# Patient Record
Sex: FEMALE | Race: WHITE | NOT HISPANIC OR LATINO | Employment: FULL TIME | URBAN - METROPOLITAN AREA
[De-identification: names, ages, dates, MRNs, and addresses within clinical notes are randomized per-mention and may not be internally consistent; named-entity substitution may affect disease eponyms.]

---

## 2021-11-23 ENCOUNTER — TELEPHONE (OUTPATIENT)
Dept: OBGYN CLINIC | Facility: CLINIC | Age: 41
End: 2021-11-23

## 2021-11-24 ENCOUNTER — TELEPHONE (OUTPATIENT)
Dept: OBGYN CLINIC | Facility: CLINIC | Age: 41
End: 2021-11-24

## 2021-11-24 DIAGNOSIS — Z30.41 SURVEILLANCE FOR BIRTH CONTROL, ORAL CONTRACEPTIVES: ICD-10-CM

## 2021-11-24 DIAGNOSIS — E28.2 PCOS (POLYCYSTIC OVARIAN SYNDROME): Primary | ICD-10-CM

## 2021-11-24 RX ORDER — NORGESTIMATE AND ETHINYL ESTRADIOL 7DAYSX3 28
1 KIT ORAL DAILY
Qty: 90 TABLET | Refills: 0 | Status: SHIPPED | OUTPATIENT
Start: 2021-11-24 | End: 2022-01-28 | Stop reason: SDUPTHER

## 2021-11-24 RX ORDER — NORGESTIMATE AND ETHINYL ESTRADIOL 7DAYSX3 28
1 KIT ORAL DAILY
COMMUNITY
End: 2021-11-24 | Stop reason: SDUPTHER

## 2022-01-27 PROBLEM — E28.2 PCOS (POLYCYSTIC OVARIAN SYNDROME): Status: ACTIVE | Noted: 2022-01-27

## 2022-01-28 ENCOUNTER — ANNUAL EXAM (OUTPATIENT)
Dept: OBGYN CLINIC | Facility: CLINIC | Age: 42
End: 2022-01-28
Payer: COMMERCIAL

## 2022-01-28 VITALS
WEIGHT: 246 LBS | SYSTOLIC BLOOD PRESSURE: 120 MMHG | BODY MASS INDEX: 39.53 KG/M2 | DIASTOLIC BLOOD PRESSURE: 88 MMHG | HEIGHT: 66 IN

## 2022-01-28 DIAGNOSIS — E28.2 PCOS (POLYCYSTIC OVARIAN SYNDROME): ICD-10-CM

## 2022-01-28 DIAGNOSIS — Z12.31 ENCOUNTER FOR SCREENING MAMMOGRAM FOR MALIGNANT NEOPLASM OF BREAST: ICD-10-CM

## 2022-01-28 DIAGNOSIS — Z30.41 SURVEILLANCE FOR BIRTH CONTROL, ORAL CONTRACEPTIVES: ICD-10-CM

## 2022-01-28 DIAGNOSIS — Z01.419 ENCOUNTER FOR ANNUAL ROUTINE GYNECOLOGICAL EXAMINATION: Primary | ICD-10-CM

## 2022-01-28 PROCEDURE — 99396 PREV VISIT EST AGE 40-64: CPT | Performed by: OBSTETRICS & GYNECOLOGY

## 2022-01-28 RX ORDER — NORGESTIMATE AND ETHINYL ESTRADIOL 7DAYSX3 28
1 KIT ORAL DAILY
Qty: 90 TABLET | Refills: 90 | Status: SHIPPED | OUTPATIENT
Start: 2022-01-28 | End: 2022-05-26

## 2022-01-28 RX ORDER — SERTRALINE HYDROCHLORIDE 100 MG/1
TABLET, FILM COATED ORAL
COMMUNITY
Start: 2017-01-28

## 2022-01-28 RX ORDER — LEVOTHYROXINE SODIUM 50 MCG
50 TABLET ORAL DAILY
COMMUNITY
Start: 2022-01-18

## 2022-01-28 RX ORDER — RISANKIZUMAB-RZAA 150 MG/ML
INJECTION SUBCUTANEOUS
COMMUNITY
Start: 2022-01-09

## 2022-01-28 NOTE — LETTER
February 13, 2022     Dee DO Nadeem  [address] [place] [address]    Patient: Clary Dumas   YOB: 1980   Date of Visit: 1/28/2022       Dear Dr Neeraj Lares: Thank you for referring Clary Dumas to me for evaluation  Below are my notes for this consultation  If you have questions, please do not hesitate to call me  I look forward to following your patient along with you  Sincerely,        Phi James MD        CC: No Recipients  Phi James MD  2/13/2022 11:13 AM  Sign when Signing Visit  Annual Wellness Visit  [address]   [address] [facility], [address], [facility], [facility] 1    ASSESSMENT/PLAN: Clary Dumas is a 37 y o  Jaren Schooner who presents for annual gynecologic exam     Encounter for routine gynecologic examination  - Routine well woman exam completed today  - Cervical Cancer Screening: Current ASCCP Guidelines reviewed  Last Pap: 11/04/2020 normal  Next Pap Due: 2 years, routine   - STI screening offered including HIV testing: offered, pt declined  - Contraceptive counseling discussed  Current contraception: oral contraceptives (estrogen/progesterone),   - Breast Cancer Screening: Last Mammogram: not yet done  - The following were reviewed in today's visit: mammography screening ordered, use and side effects of OCPs, exercise and healthy diet    Additional problems addressed during this visit:  1  Encounter for annual routine gynecological examination    2  Encounter for screening mammogram for malignant neoplasm of breast  -     Mammo screening bilateral w 3d & cad; Future; Expected date: 01/28/2023    3  PCOS (polycystic ovarian syndrome)  Assessment & Plan:  Pt wishes to continue Metformin and OCPs for PCOS  We prescribe both  Orders:  -     metFORMIN (GLUCOPHAGE) 1000 MG tablet; Take 1 tablet (1,000 mg total) by mouth 2 (two) times a day with meals    4   Surveillance for birth control, oral contraceptives  - norgestimate-ethinyl estradiol (ORTHO TRI-CYCLEN,TRINESSA) 0 18/0 215/0 25 MG-35 MCG per tablet; Take 1 tablet by mouth daily      Next visit: 1 year Wellness      CC:  Annual Gynecologic Examination    HPI: Shaunna Lefort is a 37 y o  Smith Jacks who presents for annual gynecologic examination  She denies any breast, urinary or pelvic issues at today's visit  Regular periods with OCPs  Gyn History  Patient's last menstrual period was 2022  Last Pap: 2020 was normal    She  reports being sexually active and has had partner(s) who are male  She reports using the following method of birth control/protection: OCP  OB History      Past Medical History:  No date: Anxiety  No date: Arthritis with psoriasis (HCC)  No date: Hypothyroid  No date: Obesity  No date: PCOS (polycystic ovarian syndrome)     Past Surgical History:  No date: TONSILLECTOMY     Family History   Problem Relation Age of Onset    Colon cancer Father 46    Breast cancer Neg Hx     Uterine cancer Neg Hx     Ovarian cancer Neg Hx         Social History     Tobacco Use    Smoking status: Never Smoker    Smokeless tobacco: Never Used   Vaping Use    Vaping Use: Never used   Substance Use Topics    Alcohol use: Yes     Comment: social    Drug use: Never          Current Outpatient Medications:     metFORMIN (GLUCOPHAGE) 1000 MG tablet, Take 1 tablet (1,000 mg total) by mouth 2 (two) times a day with meals, Disp: 180 tablet, Rfl: 4    norgestimate-ethinyl estradiol (ORTHO TRI-CYCLEN,TRINESSA) 0 18/0 215/0 25 MG-35 MCG per tablet, Take 1 tablet by mouth daily, Disp: 90 tablet, Rfl: 90    sertraline (ZOLOFT) 100 mg tablet, , Disp: , Rfl:     Skyrizi Pen 150 MG/ML SOAJ, , Disp: , Rfl:     Synthroid 50 MCG tablet, Take 50 mcg by mouth daily, Disp: , Rfl:     She has No Known Allergies       ROS negative except as noted in HPI    Objective:  /88   Ht 5' 6" (1 676 m)   Wt 112 kg (246 lb)   LMP 2022 Breastfeeding No   BMI 39 71 kg/m²      Physical Exam  Constitutional:       Appearance: Normal appearance  Chest:   Breasts:      Right: Normal  No mass, tenderness or axillary adenopathy  Left: Normal  No mass, tenderness or axillary adenopathy  Abdominal:      Palpations: Abdomen is soft  Tenderness: There is no abdominal tenderness  Genitourinary:     General: Normal vulva  Vagina: No bleeding or lesions  Cervix: Normal       Uterus: Normal  Not tender  Adnexa:         Right: No mass or tenderness  Left: No mass or tenderness  Rectum: No external hemorrhoid  Musculoskeletal:         General: Normal range of motion  Lymphadenopathy:      Upper Body:      Right upper body: No axillary adenopathy  Left upper body: No axillary adenopathy  Neurological:      Mental Status: She is alert and oriented to person, place, and time     Psychiatric:         Mood and Affect: Mood normal          Behavior: Behavior normal

## 2022-01-28 NOTE — PROGRESS NOTES
Annual Wellness Visit  [address]   [address], [address], [facility], [place] [address]    ASSESSMENT/PLAN: Nimesh Crystal is a 37 y o  Cely Lancaster who presents for annual gynecologic exam     Encounter for routine gynecologic examination  - Routine well woman exam completed today  - Cervical Cancer Screening: Current ASCCP Guidelines reviewed  Last Pap: 11/04/2020 normal  Next Pap Due: 2 years, routine   - STI screening offered including HIV testing: offered, pt declined  - Contraceptive counseling discussed  Current contraception: oral contraceptives (estrogen/progesterone),   - Breast Cancer Screening: Last Mammogram: not yet done  - The following were reviewed in today's visit: mammography screening ordered, use and side effects of OCPs, exercise and healthy diet    Additional problems addressed during this visit:  1  Encounter for annual routine gynecological examination    2  Encounter for screening mammogram for malignant neoplasm of breast  -     Mammo screening bilateral w 3d & cad; Future; Expected date: 01/28/2023    3  PCOS (polycystic ovarian syndrome)  Assessment & Plan:  Pt wishes to continue Metformin and OCPs for PCOS  We prescribe both  Orders:  -     metFORMIN (GLUCOPHAGE) 1000 MG tablet; Take 1 tablet (1,000 mg total) by mouth 2 (two) times a day with meals    4  Surveillance for birth control, oral contraceptives  -     norgestimate-ethinyl estradiol (ORTHO TRI-CYCLEN,TRINESSA) 0 18/0 215/0 25 MG-35 MCG per tablet; Take 1 tablet by mouth daily      Next visit: 1 year Wellness      CC:  Annual Gynecologic Examination    HPI: Nimesh Crystal is a 37 y o  Cely Lancaster who presents for annual gynecologic examination  She denies any breast, urinary or pelvic issues at today's visit  Regular periods with OCPs  Gyn History  Patient's last menstrual period was 01/24/2022       Last Pap: 11/04/2020 was normal    She  reports being sexually active and has had partner(s) who are male  She reports using the following method of birth control/protection: OCP  OB History      Past Medical History:  No date: Anxiety  No date: Arthritis with psoriasis (HCC)  No date: Hypothyroid  No date: Obesity  No date: PCOS (polycystic ovarian syndrome)     Past Surgical History:  No date: TONSILLECTOMY     Family History   Problem Relation Age of Onset    Colon cancer Father 46    Breast cancer Neg Hx     Uterine cancer Neg Hx     Ovarian cancer Neg Hx         Social History     Tobacco Use    Smoking status: Never Smoker    Smokeless tobacco: Never Used   Vaping Use    Vaping Use: Never used   Substance Use Topics    Alcohol use: Yes     Comment: social    Drug use: Never          Current Outpatient Medications:     metFORMIN (GLUCOPHAGE) 1000 MG tablet, Take 1 tablet (1,000 mg total) by mouth 2 (two) times a day with meals, Disp: 180 tablet, Rfl: 4    norgestimate-ethinyl estradiol (ORTHO TRI-CYCLEN,TRINESSA) 0 18/0 215/0 25 MG-35 MCG per tablet, Take 1 tablet by mouth daily, Disp: 90 tablet, Rfl: 90    sertraline (ZOLOFT) 100 mg tablet, , Disp: , Rfl:     Skyrizi Pen 150 MG/ML SOAJ, , Disp: , Rfl:     Synthroid 50 MCG tablet, Take 50 mcg by mouth daily, Disp: , Rfl:     She has No Known Allergies       ROS negative except as noted in HPI    Objective:  /88   Ht 5' 6" (1 676 m)   Wt 112 kg (246 lb)   LMP 2022   Breastfeeding No   BMI 39 71 kg/m²      Physical Exam  Constitutional:       Appearance: Normal appearance  Chest:   Breasts:      Right: Normal  No mass, tenderness or axillary adenopathy  Left: Normal  No mass, tenderness or axillary adenopathy  Abdominal:      Palpations: Abdomen is soft  Tenderness: There is no abdominal tenderness  Genitourinary:     General: Normal vulva  Vagina: No bleeding or lesions  Cervix: Normal       Uterus: Normal  Not tender  Adnexa:         Right: No mass or tenderness            Left: No mass or tenderness  Rectum: No external hemorrhoid  Musculoskeletal:         General: Normal range of motion  Lymphadenopathy:      Upper Body:      Right upper body: No axillary adenopathy  Left upper body: No axillary adenopathy  Neurological:      Mental Status: She is alert and oriented to person, place, and time     Psychiatric:         Mood and Affect: Mood normal          Behavior: Behavior normal

## 2022-03-09 ENCOUNTER — VBI (OUTPATIENT)
Dept: ADMINISTRATIVE | Facility: OTHER | Age: 42
End: 2022-03-09

## 2022-03-09 NOTE — TELEPHONE ENCOUNTER
Upon review of the In Basket request we were able to locate, review, and update the patient chart as requested for Pap Smear (HPV) aka Cervical Cancer Screening  Any additional questions or concerns should be emailed to the Practice Liaisons via [email or web address] email, please do not reply via In Basket      Thank you  Jan Maddox

## 2022-09-21 ENCOUNTER — TELEPHONE (OUTPATIENT)
Dept: OBGYN CLINIC | Facility: CLINIC | Age: 42
End: 2022-09-21

## 2022-09-21 NOTE — TELEPHONE ENCOUNTER
Received fax request from [facility] to complete prior auth for metformin on cover my meds  Key [id number]  Contacted pharmacy to inquire on specific prior auth requirement  Abhishek Lees has received multiple prescriptions with no prior auth needed  Per pharmacy technician, no prior auth Is needed   Patient picked up med for cost of $1 76

## 2022-10-12 ENCOUNTER — TELEPHONE (OUTPATIENT)
Dept: OBGYN CLINIC | Facility: CLINIC | Age: 42
End: 2022-10-12

## 2022-10-12 NOTE — TELEPHONE ENCOUNTER
Called and left message for Stefano Done to call back   After receiving PA request for metformin, request was denied and fax was received from [facility] stating this med was covered on member's plan list  Need to clarify if patient aware of need to use [facility], change pharmacy info and get rx sent correct pharmacy

## 2022-10-12 NOTE — TELEPHONE ENCOUNTER
Called and spoke with [facility] pharmacy to determine if PA was actually needed as last month it was not, but we received another PA request form for metformin  Staff states the med did get rejected this time  After filling out info correctly into covermymeds the request could not be sent as the message states metformin is a covered med for this member and PA was not needed, called pharmacy back and updated that PA is not needed  Pharmacist states she will try to rerun rx

## 2022-10-14 NOTE — TELEPHONE ENCOUNTER
Follow-up call placed, VM received, l/m for patient regarding needing to use [facility] for Metformin, will need to register with [facility] and once completed, call back to have a script sent

## 2023-02-05 DIAGNOSIS — Z30.41 SURVEILLANCE FOR BIRTH CONTROL, ORAL CONTRACEPTIVES: ICD-10-CM

## 2023-02-06 RX ORDER — NORGESTIMATE AND ETHINYL ESTRADIOL 7DAYSX3 28
KIT ORAL
Qty: 84 TABLET | Refills: 2 | OUTPATIENT
Start: 2023-02-06

## 2023-02-07 RX ORDER — NORGESTIMATE AND ETHINYL ESTRADIOL 7DAYSX3 28
1 KIT ORAL DAILY
Qty: 84 TABLET | Refills: 1 | Status: SHIPPED | OUTPATIENT
Start: 2023-02-07

## 2023-02-07 NOTE — TELEPHONE ENCOUNTER
[facility] scheduled WA for 05/12/23  She is requesting OCP renewal to [place]

## 2023-05-11 NOTE — PATIENT INSTRUCTIONS
- Maintain healthy weight with BMI ideally between 18-25     - Eat a healthy diet, including multiple servings of vegetables and fruits, as well as lean protein sources  - Get at least 150 minutes of moderate aerobic activity or 75 minutes of vigorous aerobic activity a week, or a combination of moderate and vigorous activity  Greater amounts of exercise will provide an even greater health benefit  - Ensure diet provides 1200mg of Calcium daily (divided) and 800IU of vitamin D, or take supplements to meet this  - Safe sex practices recommended  - Resources - information on birth control and sexually transmitted infections - www bedsider  org            - information on sexually transmitted infections - www cdc gov/std/     Please obtain blood pressure cuff at pharmacy and start checking BP at least 4x/week   - return in 2 months with values and for follow up   - if blood pressures are routinely >140/90 - I recommend stopping OCPs and see PCP and can follow up soon for alternatives

## 2023-05-12 ENCOUNTER — ANNUAL EXAM (OUTPATIENT)
Dept: OBGYN CLINIC | Facility: CLINIC | Age: 43
End: 2023-05-12

## 2023-05-12 VITALS
BODY MASS INDEX: 39.52 KG/M2 | DIASTOLIC BLOOD PRESSURE: 76 MMHG | HEIGHT: 65 IN | WEIGHT: 237.2 LBS | SYSTOLIC BLOOD PRESSURE: 142 MMHG

## 2023-05-12 DIAGNOSIS — Z01.419 ENCOUNTER FOR ANNUAL ROUTINE GYNECOLOGICAL EXAMINATION: Primary | ICD-10-CM

## 2023-05-12 DIAGNOSIS — Z86.19 HISTORY OF HPV INFECTION: ICD-10-CM

## 2023-05-12 DIAGNOSIS — Z12.31 BREAST CANCER SCREENING BY MAMMOGRAM: ICD-10-CM

## 2023-05-12 DIAGNOSIS — E28.2 PCOS (POLYCYSTIC OVARIAN SYNDROME): ICD-10-CM

## 2023-05-12 DIAGNOSIS — Z30.41 SURVEILLANCE FOR BIRTH CONTROL, ORAL CONTRACEPTIVES: ICD-10-CM

## 2023-05-12 DIAGNOSIS — Z12.4 CERVICAL CANCER SCREENING: ICD-10-CM

## 2023-05-12 DIAGNOSIS — R03.0 ELEVATED BLOOD PRESSURE READING: ICD-10-CM

## 2023-05-12 RX ORDER — GUSELKUMAB 100 MG/ML
INJECTION SUBCUTANEOUS
COMMUNITY
Start: 2023-05-09

## 2023-05-12 RX ORDER — LEVOTHYROXINE SODIUM 0.07 MG/1
75 TABLET ORAL DAILY
COMMUNITY
Start: 2023-04-19

## 2023-05-12 RX ORDER — NORGESTIMATE AND ETHINYL ESTRADIOL 7DAYSX3 28
1 KIT ORAL DAILY
Qty: 84 TABLET | Refills: 0 | Status: SHIPPED | OUTPATIENT
Start: 2023-05-12

## 2023-05-12 NOTE — LETTER
May 12, 2023     Tirso Reid, [address]  [address] [address]    Patient: Yuli Leyva   YOB: 1980   Date of Visit: 5/12/2023       Dear Dr Ridley Box: Thank you for referring Yuli Leyva to me for evaluation  Below are my notes for this consultation  If you have questions, please do not hesitate to call me  I look forward to following your patient along with you  Sincerely,        Nathan Orellana MD        CC: No Recipients  Nathan Orellana MD  5/12/2023 12:50 PM  Sign when Signing Visit  Annual Wellness Visit  [address]   [address] [facility], [address], [facility] 1    ASSESSMENT/PLAN: Yuil Leyva is a 40 y o  Sindi Ek who presents for annual gynecologic exam     Encounter for routine gynecologic examination  - Routine well woman exam completed today  - Cervical Cancer Screening: Current ASCCP Guidelines reviewed  Last Pap: 11/04/2020 normal  Next Pap Due: today h/o HPV, routine   - STI screening offered including HIV testing: offered, pt declined  - Contraceptive counseling discussed  Current contraception: oral contraceptives (estrogen/progesterone),   - Breast Cancer Screening: Last Mammogram 03/28/2022, normal  - The following were reviewed in today's visit: mammography screening ordered, use and side effects of OCPs, exercise and healthy diet    Additional problems addressed during this visit:  1  Encounter for annual routine gynecological examination    2  Breast cancer screening by mammogram  -     Mammo screening bilateral w 3d & cad; Future; Expected date: 05/10/2024    3  PCOS (polycystic ovarian syndrome)  Assessment & Plan:  Currently on metformin and OCPs for PCOS  Periods regular  Unsure when last HgA1c or cholesterol testing  Provided orders today  Orders:  -     Hemoglobin A1C; Future  -     Lipid Panel with Direct LDL reflex;  Future  -     Hemoglobin A1C  -     Lipid Panel with Direct LDL reflex  - metFORMIN (GLUCOPHAGE) 1000 MG tablet; Take 1 tablet (1,000 mg total) by mouth 2 (two) times a day with meals    4  History of HPV infection  -     IGP, Aptima HPV, Rfx 16/18,45    5  Cervical cancer screening  -     IGP, Aptima HPV, Rfx 16/18,45    6  Surveillance for birth control, oral contraceptives  A/P:   Reviewed blood pressure concerns with patient  She agrees to monitor blood pressure closely and return in 2 months to follow up   90 day supply given  Patient made aware I will not refill further without follow ups  -     norgestimate-ethinyl estradiol (Tri-Sprintec) 0 18/0 215/0 25 MG-35 MCG per tablet; Take 1 tablet by mouth daily    7  Elevated blood pressure reading  Assessment & Plan:  Reviewed blood pressures in office are both elevated and consistent with HTN  Jaya Snow denies history of prior blood pressure issues  Discussed OCPs can sometimes lead to elevated BP, also estrogen in OCPs in patients with HTN increases risk of blood clots, stroke and heart attack  Reviewed we should consider alternative without estrogen  Jaya Snow does not want to stop OCPs  I agreed to give her a 90 day supply and have her check her BP at home 3-4x/week, then return in 2 months for f/u in our office and review of values and recheck  If elevated I will not prescribe OCPs further  If her BP at home are consistently >140/90, I recommend she stop the OCPs, see her PCP and return here to discuss alternatives  She agrees to plan  Next visit: 2 months blood pressure check; 1 year Wellness      CC:  Annual Gynecologic Examination    HPI: Yuli Leyva is a 40 y o  Sindi Ek who presents for annual gynecologic examination  She denies any breast, urinary or pelvic issues at today's visit  Weight loss program with [facility] started recently  10lbs decreased from last year  Periods regular with OCPs  No issues  Sexually active, no new partners  First /92    Pt denies h/o elevated BP in past           Gyn History  Patient's last menstrual period was 2023 (exact date)  Last Pap: 2020 was normal    She  reports being sexually active and has had partner(s) who are male  She reports using the following method of birth control/protection: OCP  OB History      Past Medical History:  2015: Abnormal Pap smear of cervix      Comment:  + HPV, returned to negative  2006: Anxiety and depression  No date: Arthritis with psoriasis ([address] )  2011: Female infertility      Comment:  No interest in pursuing  No date: Hypothyroid      Comment:  seeing endocrinologist - Dr Delmi Zepeda [facility]  No date: Obesity  No date: PCOS (polycystic ovarian syndrome)     Past Surgical History:  No date: TONSILLECTOMY     Family History   Problem Relation Age of Onset   • Rashes / Skin problems Mother    • Thyroid disease Mother         Hypothyroidism   • Colon cancer Father            • Diabetes Father         Type 2   • Psoriasis Father    • Mental illness Brother    • Heart failure Maternal Grandmother    • Diabetes Paternal Grandmother    • Lung cancer Paternal Grandmother    • Bone cancer Paternal Grandmother    • Kidney cancer Paternal Grandfather    • Breast cancer Neg Hx    • Uterine cancer Neg Hx    • Ovarian cancer Neg Hx         Social History     Tobacco Use   • Smoking status: Never   • Smokeless tobacco: Never   Vaping Use   • Vaping Use: Never used   Substance Use Topics   • Alcohol use:  Yes     Alcohol/week: 1 0 standard drink     Types: 1 Glasses of wine per week     Comment: social   • Drug use: Never          Current Outpatient Medications:   •  levothyroxine 75 mcg tablet, Take 75 mcg by mouth daily, Disp: , Rfl:   •  metFORMIN (GLUCOPHAGE) 1000 MG tablet, Take 1 tablet (1,000 mg total) by mouth 2 (two) times a day with meals, Disp: 180 tablet, Rfl: 4  •  norgestimate-ethinyl estradiol (Tri-Sprintec) 0 18/0 215/0 25 MG-35 MCG per tablet, Take 1 tablet by mouth daily, Disp: 84 tablet, Rfl: 0  • "sertraline (ZOLOFT) 100 mg tablet, , Disp: , Rfl:   •  Tremfya subcutaneous injection, , Disp: , Rfl:     She has No Known Allergies       ROS negative except as noted in HPI    Objective:  /76 (BP Location: Left arm, Patient Position: Sitting, Cuff Size: Thigh)   Ht 5' 5\" (1 651 m)   Wt 108 kg (237 lb 3 2 oz)   LMP 04/29/2023 (Exact Date)   BMI 39 47 kg/m²     Physical Exam  Constitutional:       Appearance: Normal appearance  Chest:   Breasts:     Right: Normal  No mass or tenderness  Left: Normal  No mass or tenderness  Abdominal:      Palpations: Abdomen is soft  Tenderness: There is no abdominal tenderness  Genitourinary:     General: Normal vulva  Vagina: No bleeding or lesions  Cervix: Normal       Uterus: Normal  Not tender  Adnexa:         Right: No mass or tenderness  Left: No mass or tenderness  Rectum: No external hemorrhoid  Musculoskeletal:         General: Normal range of motion  Lymphadenopathy:      Upper Body:      Right upper body: No axillary adenopathy  Left upper body: No axillary adenopathy  Neurological:      Mental Status: She is alert and oriented to person, place, and time     Psychiatric:         Mood and Affect: Mood normal          Behavior: Behavior normal           "

## 2023-05-12 NOTE — ASSESSMENT & PLAN NOTE
Reviewed blood pressures in office are both elevated and consistent with HTN  Angeilc Eldridge denies history of prior blood pressure issues  Discussed OCPs can sometimes lead to elevated BP, also estrogen in OCPs in patients with HTN increases risk of blood clots, stroke and heart attack  Reviewed we should consider alternative without estrogen  Angelic Eldridge does not want to stop OCPs  I agreed to give her a 90 day supply and have her check her BP at home 3-4x/week, then return in 2 months for f/u in our office and review of values and recheck  If elevated I will not prescribe OCPs further  If her BP at home are consistently >140/90, I recommend she stop the OCPs, see her PCP and return here to discuss alternatives  She agrees to plan

## 2023-05-12 NOTE — PROGRESS NOTES
Annual Wellness Visit  [address]   [address], [address], [place], [place]    ASSESSMENT/PLAN: Deandre Weinstein is a 40 y o  Nolon Phani who presents for annual gynecologic exam     Encounter for routine gynecologic examination  - Routine well woman exam completed today  - Cervical Cancer Screening: Current ASCCP Guidelines reviewed  Last Pap: 11/04/2020 normal  Next Pap Due: today h/o HPV, routine   - STI screening offered including HIV testing: offered, pt declined  - Contraceptive counseling discussed  Current contraception: oral contraceptives (estrogen/progesterone),   - Breast Cancer Screening: Last Mammogram 03/28/2022, normal  - The following were reviewed in today's visit: mammography screening ordered, use and side effects of OCPs, exercise and healthy diet    Additional problems addressed during this visit:  1  Encounter for annual routine gynecological examination    2  Breast cancer screening by mammogram  -     Mammo screening bilateral w 3d & cad; Future; Expected date: 05/10/2024    3  PCOS (polycystic ovarian syndrome)  Assessment & Plan:  Currently on metformin and OCPs for PCOS  Periods regular  Unsure when last HgA1c or cholesterol testing  Provided orders today  Orders:  -     Hemoglobin A1C; Future  -     Lipid Panel with Direct LDL reflex; Future  -     Hemoglobin A1C  -     Lipid Panel with Direct LDL reflex  -     metFORMIN (GLUCOPHAGE) 1000 MG tablet; Take 1 tablet (1,000 mg total) by mouth 2 (two) times a day with meals    4  History of HPV infection  -     IGP, Aptima HPV, Rfx 16/18,45    5  Cervical cancer screening  -     IGP, Aptima HPV, Rfx 16/18,45    6  Surveillance for birth control, oral contraceptives  A/P:   Reviewed blood pressure concerns with patient  She agrees to monitor blood pressure closely and return in 2 months to follow up   90 day supply given  Patient made aware I will not refill further without follow ups    - norgestimate-ethinyl estradiol (Tri-Sprintec) 0 18/0 215/0 25 MG-35 MCG per tablet; Take 1 tablet by mouth daily    7  Elevated blood pressure reading  Assessment & Plan:  Reviewed blood pressures in office are both elevated and consistent with HTN  Nancy Humphrey denies history of prior blood pressure issues  Discussed OCPs can sometimes lead to elevated BP, also estrogen in OCPs in patients with HTN increases risk of blood clots, stroke and heart attack  Reviewed we should consider alternative without estrogen  Nancy Humphrey does not want to stop OCPs  I agreed to give her a 90 day supply and have her check her BP at home 3-4x/week, then return in 2 months for f/u in our office and review of values and recheck  If elevated I will not prescribe OCPs further  If her BP at home are consistently >140/90, I recommend she stop the OCPs, see her PCP and return here to discuss alternatives  She agrees to plan  Next visit: 2 months blood pressure check; 1 year Wellness      CC:  Annual Gynecologic Examination    HPI: Lenin Jenkins is a 40 y o  Sidney Loron who presents for annual gynecologic examination  She denies any breast, urinary or pelvic issues at today's visit  Weight loss program with [facility] started recently  10lbs decreased from last year  Periods regular with OCPs  No issues  Sexually active, no new partners  First /92  Pt denies h/o elevated BP in past           Gyn History  Patient's last menstrual period was 2023 (exact date)  Last Pap: 2020 was normal    She  reports being sexually active and has had partner(s) who are male  She reports using the following method of birth control/protection: OCP  OB History      Past Medical History:  2015: Abnormal Pap smear of cervix      Comment:  + HPV, returned to negative  2006:  Anxiety and depression  No date: Arthritis with psoriasis [facility])  2011: Female infertility      Comment:  No interest in pursuing  No date: "Hypothyroid      Comment:  seeing endocrinologist - Dr Lorie Hinton [facility]  No date: Obesity  No date: PCOS (polycystic ovarian syndrome)     Past Surgical History:  No date: TONSILLECTOMY     Family History   Problem Relation Age of Onset   • Rashes / Skin problems Mother    • Thyroid disease Mother         Hypothyroidism   • Colon cancer Father            • Diabetes Father         Type 2   • Psoriasis Father    • Mental illness Brother    • Heart failure Maternal Grandmother    • Diabetes Paternal Grandmother    • Lung cancer Paternal Grandmother    • Bone cancer Paternal Grandmother    • Kidney cancer Paternal Grandfather    • Breast cancer Neg Hx    • Uterine cancer Neg Hx    • Ovarian cancer Neg Hx         Social History     Tobacco Use   • Smoking status: Never   • Smokeless tobacco: Never   Vaping Use   • Vaping Use: Never used   Substance Use Topics   • Alcohol use: Yes     Alcohol/week: 1 0 standard drink     Types: 1 Glasses of wine per week     Comment: social   • Drug use: Never          Current Outpatient Medications:   •  levothyroxine 75 mcg tablet, Take 75 mcg by mouth daily, Disp: , Rfl:   •  metFORMIN (GLUCOPHAGE) 1000 MG tablet, Take 1 tablet (1,000 mg total) by mouth 2 (two) times a day with meals, Disp: 180 tablet, Rfl: 4  •  norgestimate-ethinyl estradiol (Tri-Sprintec) 0 18/0 215/0 25 MG-35 MCG per tablet, Take 1 tablet by mouth daily, Disp: 84 tablet, Rfl: 0  •  sertraline (ZOLOFT) 100 mg tablet, , Disp: , Rfl:   •  Tremfya subcutaneous injection, , Disp: , Rfl:     She has No Known Allergies       ROS negative except as noted in HPI    Objective:  /76 (BP Location: Left arm, Patient Position: Sitting, Cuff Size: Thigh)   Ht 5' 5\" (1 651 m)   Wt 108 kg (237 lb 3 2 oz)   LMP 2023 (Exact Date)   BMI 39 47 kg/m²      Physical Exam  Constitutional:       Appearance: Normal appearance  Chest:   Breasts:     Right: Normal  No mass or tenderness        Left: Normal  No mass or " tenderness  Abdominal:      Palpations: Abdomen is soft  Tenderness: There is no abdominal tenderness  Genitourinary:     General: Normal vulva  Vagina: No bleeding or lesions  Cervix: Normal       Uterus: Normal  Not tender  Adnexa:         Right: No mass or tenderness  Left: No mass or tenderness  Rectum: No external hemorrhoid  Musculoskeletal:         General: Normal range of motion  Lymphadenopathy:      Upper Body:      Right upper body: No axillary adenopathy  Left upper body: No axillary adenopathy  Neurological:      Mental Status: She is alert and oriented to person, place, and time     Psychiatric:         Mood and Affect: Mood normal          Behavior: Behavior normal

## 2023-05-12 NOTE — ASSESSMENT & PLAN NOTE
Currently on metformin and OCPs for PCOS  Periods regular  Unsure when last HgA1c or cholesterol testing  Provided orders today

## 2023-05-16 LAB
CYTOLOGIST CVX/VAG CYTO: NORMAL
DX ICD CODE: NORMAL
HPV GENOTYPE REFLEX: NORMAL
HPV I/H RISK 4 DNA CVX QL PROBE+SIG AMP: NEGATIVE
OTHER STN SPEC: NORMAL
PATH REPORT.FINAL DX SPEC: NORMAL
SL AMB NOTE:: NORMAL
SL AMB SPECIMEN ADEQUACY: NORMAL
SL AMB TEST METHODOLOGY: NORMAL

## 2023-05-18 LAB
CHOLEST SERPL-MCNC: 185 MG/DL (ref 100–199)
EST. AVERAGE GLUCOSE BLD GHB EST-MCNC: 103 MG/DL
HBA1C MFR BLD: 5.2 % (ref 4.8–5.6)
HDLC SERPL-MCNC: 39 MG/DL
LDLC SERPL CALC-MCNC: 85 MG/DL (ref 0–99)
LDLC/HDLC SERPL: 2.2 RATIO (ref 0–3.2)
SL AMB VLDL CHOLESTEROL CALC: 61 MG/DL (ref 5–40)
TRIGL SERPL-MCNC: 377 MG/DL (ref 0–149)

## 2023-07-06 NOTE — PROGRESS NOTES
[address]  [address] [facility], [address], [place], [address] [facility]    Assessment/Plan:  1. Surveillance for birth control, oral contraceptives  A/P:   Refilled until [address] 2024.  -     norgestimate-ethinyl estradiol (Tri-Sprintec) 0.18/0.215/0.25 MG-35 MCG per tablet; Take 1 tablet by mouth daily    2. Elevated blood pressure reading  Assessment & Plan:  Swetha Barboza reports her BP mostly 130/80s at home. She states was at endocrinology yesterday and 120s/80s there. Okay to continue OCPs. Encouraged to watch BP at home and if consistently > 140/90 please contact me so we can discuss alternatives. She agrees. I have spent a total time of 20 minutes on 23 in caring for this patient including Diagnostic results, Instructions for management, Impressions, Counseling / Coordination of care, Documenting in the medical record and Obtaining or reviewing history  . Subjective:   Rosana Lowe is a 40 y.o. [address] [facility] female. CC: follow up on my blood pressure      HPI:   Radha was here for [address] in May and both SBP >140. She denied h/o HTN in past.  Discussed some increase risk of OCPs if HTN and she wanted to continue and watch BP closely. She reports checking BP at home and mostly 130/80s. Also was at endocrinology yesterday and normal there. Wishes to continue OCPs. Gyn History  Patient's last menstrual period was 2023 (exact date). Last pap smear: 2023    She  reports being sexually active and has had partner(s) who are male. She reports using the following method of birth control/protection: OCP. OB History      Past Medical History:  2015: Abnormal Pap smear of cervix      Comment:  + HPV, returned to negative. 2006:  Anxiety and depression  No date: Arthritis with psoriasis [facility])  2011: Female infertility      Comment:  No interest in pursuing  No date: Hypothyroid      Comment:  seeing endocrinologist - Dr. Boris Colindres [facility]  No date: Obesity  No date: PCOS (polycystic ovarian syndrome)     Past Surgical History:  No date: TONSILLECTOMY     Social History     Tobacco Use   • Smoking status: Never   • Smokeless tobacco: Never   Vaping Use   • Vaping Use: Never used   Substance Use Topics   • Alcohol use: Yes     Alcohol/week: 1.0 standard drink of alcohol     Types: 1 Glasses of wine per week     Comment: social   • Drug use: Never          Current Outpatient Medications:   •  levothyroxine 75 mcg tablet, Take 75 mcg by mouth daily, Disp: , Rfl:   •  metFORMIN (GLUCOPHAGE) 1000 MG tablet, Take 1 tablet (1,000 mg total) by mouth 2 (two) times a day with meals, Disp: 180 tablet, Rfl: 4  •  norgestimate-ethinyl estradiol (Tri-Sprintec) 0.18/0.215/0.25 MG-35 MCG per tablet, Take 1 tablet by mouth daily, Disp: 84 tablet, Rfl: 3  •  sertraline (ZOLOFT) 100 mg tablet, , Disp: , Rfl:   •  Tremfya subcutaneous injection, , Disp: , Rfl:     She has No Known Allergies. .    ROS: Review of Systems   Constitutional: Negative. Gastrointestinal: Negative. Genitourinary: Negative. Psychiatric/Behavioral: Negative. Objective:  /88   Ht 5' 5.25" (1.657 m)   Wt 104 kg (229 lb 9.6 oz)   LMP 06/23/2023 (Exact Date)   Breastfeeding No   BMI 37.92 kg/m²      Physical Exam  Constitutional:       Appearance: Normal appearance. Neurological:      Mental Status: She is alert and oriented to person, place, and time.    Psychiatric:         Behavior: Behavior normal.

## 2023-07-07 ENCOUNTER — OFFICE VISIT (OUTPATIENT)
Dept: OBGYN CLINIC | Facility: CLINIC | Age: 43
End: 2023-07-07
Payer: COMMERCIAL

## 2023-07-07 VITALS
DIASTOLIC BLOOD PRESSURE: 88 MMHG | BODY MASS INDEX: 38.25 KG/M2 | SYSTOLIC BLOOD PRESSURE: 124 MMHG | HEIGHT: 65 IN | WEIGHT: 229.6 LBS

## 2023-07-07 DIAGNOSIS — Z30.41 SURVEILLANCE FOR BIRTH CONTROL, ORAL CONTRACEPTIVES: Primary | ICD-10-CM

## 2023-07-07 DIAGNOSIS — R03.0 ELEVATED BLOOD PRESSURE READING: ICD-10-CM

## 2023-07-07 PROCEDURE — 99213 OFFICE O/P EST LOW 20 MIN: CPT | Performed by: OBSTETRICS & GYNECOLOGY

## 2023-07-07 RX ORDER — NORGESTIMATE AND ETHINYL ESTRADIOL 7DAYSX3 28
1 KIT ORAL DAILY
Qty: 84 TABLET | Refills: 3 | Status: SHIPPED | OUTPATIENT
Start: 2023-07-07

## 2023-07-07 NOTE — ASSESSMENT & PLAN NOTE
Sudeep Clark reports her BP mostly 130/80s at home. She states was at endocrinology yesterday and 120s/80s there. Okay to continue OCPs. Encouraged to watch BP at home and if consistently > 140/90 please contact me so we can discuss alternatives. She agrees.

## 2024-05-30 DIAGNOSIS — E28.2 PCOS (POLYCYSTIC OVARIAN SYNDROME): ICD-10-CM

## 2024-06-19 DIAGNOSIS — Z30.41 SURVEILLANCE FOR BIRTH CONTROL, ORAL CONTRACEPTIVES: ICD-10-CM

## 2024-06-19 RX ORDER — NORGESTIMATE AND ETHINYL ESTRADIOL 7DAYSX3 28
1 KIT ORAL DAILY
Qty: 84 TABLET | Refills: 1 | Status: SHIPPED | OUTPATIENT
Start: 2024-06-19

## 2024-06-19 RX ORDER — NORGESTIMATE AND ETHINYL ESTRADIOL 7DAYSX3 28
1 KIT ORAL DAILY
Qty: 84 TABLET | Refills: 0 | OUTPATIENT
Start: 2024-06-19

## 2024-08-23 ENCOUNTER — OFFICE VISIT (OUTPATIENT)
Dept: OBGYN CLINIC | Facility: CLINIC | Age: 44
End: 2024-08-23
Payer: COMMERCIAL

## 2024-08-23 VITALS
WEIGHT: 194.4 LBS | SYSTOLIC BLOOD PRESSURE: 142 MMHG | DIASTOLIC BLOOD PRESSURE: 96 MMHG | BODY MASS INDEX: 32.39 KG/M2 | HEIGHT: 65 IN

## 2024-08-23 DIAGNOSIS — Z01.419 ENCOUNTER FOR ANNUAL ROUTINE GYNECOLOGICAL EXAMINATION: Primary | ICD-10-CM

## 2024-08-23 DIAGNOSIS — R03.0 ELEVATED BLOOD PRESSURE READING: ICD-10-CM

## 2024-08-23 DIAGNOSIS — Z12.31 ENCOUNTER FOR SCREENING MAMMOGRAM FOR MALIGNANT NEOPLASM OF BREAST: ICD-10-CM

## 2024-08-23 DIAGNOSIS — E28.2 PCOS (POLYCYSTIC OVARIAN SYNDROME): ICD-10-CM

## 2024-08-23 DIAGNOSIS — Z30.41 SURVEILLANCE FOR BIRTH CONTROL, ORAL CONTRACEPTIVES: ICD-10-CM

## 2024-08-23 PROCEDURE — 99396 PREV VISIT EST AGE 40-64: CPT | Performed by: OBSTETRICS & GYNECOLOGY

## 2024-08-23 RX ORDER — TIRZEPATIDE 5 MG/.5ML
INJECTION, SOLUTION SUBCUTANEOUS
COMMUNITY
Start: 2024-08-06

## 2024-08-23 RX ORDER — NORGESTIMATE AND ETHINYL ESTRADIOL 7DAYSX3 28
1 KIT ORAL DAILY
Qty: 84 TABLET | Refills: 0 | Status: SHIPPED | OUTPATIENT
Start: 2024-08-23

## 2024-08-23 NOTE — PROGRESS NOTES
Annual Wellness Visit  [facility] - [address], [address], [place] [address]    ASSESSMENT/PLAN: Laura Valadez is a 43 y.o.  who presents for annual gynecologic exam.    Encounter for routine gynecologic examination  - Routine well woman exam completed today.  - Cervical Cancer Screening: Current ASCCP Guidelines reviewed. Last Pap: 2023 normal. Past abnormal pap: h/o HPV in past.  Next Pap Due: 2 years.  - STI screening offered including HIV testing: offered, pt declined  - Contraceptive counseling discussed.  Current contraception: oral contraceptives (estrogen/progesterone),   - Breast Cancer Screening: Last Mammogram 2022, normal on f/u imaging  - The following were reviewed in today's visit: mammography screening ordered, use and side effects of OCPs, exercise, and healthy diet    Additional problems addressed during this visit:  1. Encounter for annual routine gynecological examination  2. Encounter for screening mammogram for malignant neoplasm of breast  -     Mammo screening bilateral w 3d & cad; Future  3. Elevated blood pressure reading  Assessment & Plan:  BP elevated very mildly in office today 144/84, 142/92.  Was [place] [facility] [place] and she followed at home and was normal for a couple months.  She reports hasn't checked at home in a while but always normal in past.  Also normal at endocrinology 2-3x/year and at  PCP couple times a year..    This year mildly elevated again.  Reviewed again possible white coat HTN vs cHTN.  Discussed OCPs can sometimes elevated BP and patients with HTN on OCPs have increased MI and stroke risk.  Could consider POPs.  Except pt with PCOS and may not have bleeding on POPs.  She agrees to check BP at home again for next 2 months and return to review.  If normal will continue OCPs.  She is aware of risks.  3 months OCP to pharmacy.  Will not refill w/o BP check.  4. Surveillance for birth control, oral  contraceptives  A/P:   See elevated BP problems.  Will give 3 months supply today.  -     norgestimate-ethinyl estradiol (Tri-Sprintec) 0.18/0.215/0.25 MG-35 MCG per tablet; Take 1 tablet by mouth daily  5. PCOS (polycystic ovarian syndrome)  Assessment & Plan:  On Metformin and OCPs for PCOS.  Endo watches HgA1C.      Next visit: 1 year Wellness      CC:  Annual Gynecologic Examination    HPI: Larua Valadez is a 43 y.o.  who presents for annual gynecologic examination.  She denies any breast, urinary or pelvic issues at today's visit.    Lost 35lbs in last year on Zepbound with endocrinology.      Regular periods with OCPs, wishes to continue.  Reviewed BP concerns.  Checks at home sometimes and always normal, normal at endocrinology 2-3x/year.  Also sees PCP couple times a year and BP normal there.  Sexually active, no new partners.  No issues.        Gyn History  Patient's last menstrual period was 2024.     Last Pap: 2023 was normal    She  reports being sexually active and has had partner(s) who are male. She reports using the following method of birth control/protection: OCP.       OB History      Past Medical History:  2015: Abnormal Pap smear of cervix      Comment:  + HPV, returned to negative.  2006: Anxiety and depression  No date: Arthritis with psoriasis (HCC)  2011: Female infertility      Comment:  No interest in pursuing  No date: Hypothyroid      Comment:  seeing endocrinologist - Dr. Veronica LOPEZ  No date: Obesity  No date: PCOS (polycystic ovarian syndrome)     Past Surgical History:  No date: TONSILLECTOMY     Family History   Problem Relation Age of Onset    Rashes / Skin problems Mother     Thyroid disease Mother         Hypothyroidism    Colon cancer Father 52            Diabetes Father         Type 2    Psoriasis Father     Mental illness Brother     Heart failure Maternal Grandmother     Diabetes Paternal Grandmother     Lung cancer Paternal Grandmother      "Bone cancer Paternal Grandmother     Kidney cancer Paternal Grandfather     Breast cancer Neg Hx     Uterine cancer Neg Hx     Ovarian cancer Neg Hx         Social History     Tobacco Use    Smoking status: Never     Passive exposure: Never    Smokeless tobacco: Never   Vaping Use    Vaping status: Never Used   Substance Use Topics    Alcohol use: Yes     Alcohol/week: 1.0 standard drink of alcohol     Types: 1 Glasses of wine per week     Comment: social    Drug use: Never          Current Outpatient Medications:     levothyroxine 75 mcg tablet, Take 75 mcg by mouth daily, Disp: , Rfl:     norgestimate-ethinyl estradiol (Tri-Sprintec) 0.18/0.215/0.25 MG-35 MCG per tablet, Take 1 tablet by mouth daily, Disp: 84 tablet, Rfl: 0    sertraline (ZOLOFT) 100 mg tablet, , Disp: , Rfl:     Tremfya subcutaneous injection, , Disp: , Rfl:     Zepbound 5 MG/0.5ML auto-injector, ADMINISTER 5 MG UNDER THE SKIN EVERY WEEK, Disp: , Rfl:     metFORMIN (GLUCOPHAGE) 1000 MG tablet, TAKE 1 TABLET(1000 MG) BY MOUTH TWICE DAILY WITH MEALS, Disp: 180 tablet, Rfl: 0    She has No Known Allergies..    ROS negative except as noted in HPI    Objective:  /96 (BP Location: Left arm, Patient Position: Sitting, Cuff Size: Large)   Ht 5' 5.25\" (1.657 m)   Wt 88.2 kg (194 lb 6.4 oz)   LMP 08/14/2024   BMI 32.10 kg/m²      Physical Exam  Constitutional:       Appearance: Normal appearance.   Chest:   Breasts:     Right: Normal. No mass or tenderness.      Left: Normal. No mass or tenderness.   Abdominal:      Palpations: Abdomen is soft.      Tenderness: There is no abdominal tenderness.   Genitourinary:     General: Normal vulva.      Vagina: No bleeding or lesions.      Cervix: Normal.      Uterus: Normal. Not tender.       Adnexa:         Right: No mass or tenderness.          Left: No mass or tenderness.        Rectum: No external hemorrhoid.   Musculoskeletal:         General: Normal range of motion.   Lymphadenopathy:      Upper " Body:      Right upper body: No axillary adenopathy.      Left upper body: No axillary adenopathy.   Neurological:      Mental Status: She is alert and oriented to person, place, and time.   Psychiatric:         Mood and Affect: Mood normal.         Behavior: Behavior normal.

## 2024-08-23 NOTE — PATIENT INSTRUCTIONS
Check BP at home a couple times a week.   - record values   - return 2 months to review   - if persistently >140/90 - call me earlier      - Maintain healthy weight with BMI ideally between 18-25.    - Eat a healthy diet, including multiple servings of vegetables and fruits, as well as lean protein sources.  - Get at least 150 minutes of moderate aerobic activity or 75 minutes of vigorous aerobic activity a week, or a combination of moderate and vigorous activity.  Greater amounts of exercise will provide an even greater health benefit.   - Ensure diet provides 1200mg of Calcium daily (divided) and 800IU of vitamin D, or take supplements to meet this.  - Safe sex practices recommended.    - Resources - information on birth control and sexually transmitted infections - www.bedsider.org            - information on sexually transmitted infections - www.cdc.gov/std/

## 2024-08-23 NOTE — ASSESSMENT & PLAN NOTE
BP elevated very mildly in office today 144/84, 142/92.  Was elevated at [facility] [place] and she followed at home and was normal for a couple months.  She reports hasn't checked at home in a while but always normal in past.  Also normal at endocrinology 2-3x/year and at  PCP couple times a year..    This year mildly elevated again.  Reviewed again possible white coat HTN vs cHTN.  Discussed OCPs can sometimes elevated BP and patients with HTN on OCPs have increased MI and stroke risk.  Could consider POPs.  Except pt with PCOS and may not have bleeding on POPs.  She agrees to check BP at home again for next 2 months and return to review.  If normal will continue OCPs.  She is aware of risks.  3 months OCP to pharmacy.  Will not refill w/o BP check.

## 2024-10-17 ENCOUNTER — OFFICE VISIT (OUTPATIENT)
Dept: OBGYN CLINIC | Facility: CLINIC | Age: 44
End: 2024-10-17
Payer: COMMERCIAL

## 2024-10-17 VITALS
BODY MASS INDEX: 31.99 KG/M2 | WEIGHT: 192 LBS | SYSTOLIC BLOOD PRESSURE: 132 MMHG | HEIGHT: 65 IN | DIASTOLIC BLOOD PRESSURE: 86 MMHG

## 2024-10-17 DIAGNOSIS — Z30.41 SURVEILLANCE FOR BIRTH CONTROL, ORAL CONTRACEPTIVES: ICD-10-CM

## 2024-10-17 PROCEDURE — 99213 OFFICE O/P EST LOW 20 MIN: CPT | Performed by: OBSTETRICS & GYNECOLOGY

## 2024-10-17 RX ORDER — NORGESTIMATE AND ETHINYL ESTRADIOL 7DAYSX3 28
1 KIT ORAL DAILY
Qty: 84 TABLET | Refills: 3 | Status: SHIPPED | OUTPATIENT
Start: 2024-10-17

## 2024-10-17 NOTE — PROGRESS NOTES
[facility] - [place]  [address], [address], [place] [address]    Assessment & Plan  Surveillance for birth control, oral contraceptives  BP at home and in office in 130/80s range.  Reviewed again with Laura that OCPs can increase BP and in patients with elevated BP who are on OCPs have increase cardiovascular and thrombosis risk.    She understands and wishes to continue.  Given BP < 140/90 refilled.  Encouraged her to continue to monitor BP and if > 140/90 considering stopping OCPs.  Refilled until 2025 [place].  Orders:    norgestimate-ethinyl estradiol (Tri-Sprintec) 0.18/0.215/0.25 MG-35 MCG per tablet; Take 1 tablet by mouth daily      I have spent a total time of 20 minutes in caring for this patient on the day of the visit/encounter including Risks and benefits of tx options, Instructions for management, Patient and family education, Impressions, Counseling / Coordination of care, Documenting in the medical record, and Obtaining or reviewing history  .      Subjective:   Laura Valadez is a 43 y.o.  female.    HPI:   Laura presents for follow up for blood pressure.  At her Wellness visit in August her BP was slightly elevated.  We discussed R/B OCPs with elevated BP.  She wished to continue OCPs so was to monitor her BP over last 2 months and return for f/u.    She reports checking BP at home a couple times a week - generally 130/80.  No real BP > 140/90.  She wishes to continue OCPs      Gyn History  No LMP recorded.       Last pap smear: 2023    She  reports being sexually active and has had partner(s) who are male. She reports using the following method of birth control/protection: OCP.       OB History      Past Medical History:  2015: Abnormal Pap smear of cervix      Comment:  + HPV, returned to negative.  2006: Anxiety and depression  No date: Arthritis with psoriasis (HCC)  2011: Female infertility      Comment:  No interest in pursuing  No date: Hypothyroid       Comment:  seeing endocrinologist - Dr. Martin [facility]  No date: Obesity  No date: PCOS (polycystic ovarian syndrome)     Past Surgical History:  No date: TONSILLECTOMY     Social History     Tobacco Use    Smoking status: Never     Passive exposure: Never    Smokeless tobacco: Never   Vaping Use    Vaping status: Never Used   Substance Use Topics    Alcohol use: Yes     Alcohol/week: 1.0 standard drink of alcohol     Types: 1 Glasses of wine per week     Comment: social    Drug use: Never          Current Outpatient Medications:     levothyroxine 75 mcg tablet, Take 75 mcg by mouth daily, Disp: , Rfl:     metFORMIN (GLUCOPHAGE) 1000 MG tablet, TAKE 1 TABLET(1000 MG) BY MOUTH TWICE DAILY WITH MEALS, Disp: 180 tablet, Rfl: 0    norgestimate-ethinyl estradiol (Tri-Sprintec) 0.18/0.215/0.25 MG-35 MCG per tablet, Take 1 tablet by mouth daily, Disp: 84 tablet, Rfl: 0    sertraline (ZOLOFT) 100 mg tablet, , Disp: , Rfl:     Tremfya subcutaneous injection, , Disp: , Rfl:     Zepbound 5 MG/0.5ML auto-injector, ADMINISTER 5 MG UNDER THE SKIN EVERY WEEK, Disp: , Rfl:     She has No Known Allergies..    ROS: Review of Systems   Constitutional: Negative.    Gastrointestinal: Negative.    Genitourinary: Negative.    Psychiatric/Behavioral: Negative.         Objective:  There were no vitals taken for this visit.     Physical Exam  Constitutional:       Appearance: Normal appearance.   Neurological:      Mental Status: She is alert and oriented to person, place, and time.   Psychiatric:         Behavior: Behavior normal.

## 2024-11-20 DIAGNOSIS — E28.2 PCOS (POLYCYSTIC OVARIAN SYNDROME): ICD-10-CM

## 2024-11-20 NOTE — TELEPHONE ENCOUNTER
Refill sent by POD for only 45 day supply.  I cancelled and reordered 3 month with refills until patient [place] in August.

## 2025-05-28 DIAGNOSIS — E28.2 PCOS (POLYCYSTIC OVARIAN SYNDROME): ICD-10-CM

## 2025-07-07 DIAGNOSIS — E28.2 PCOS (POLYCYSTIC OVARIAN SYNDROME): ICD-10-CM
